# Patient Record
Sex: FEMALE | Race: WHITE
[De-identification: names, ages, dates, MRNs, and addresses within clinical notes are randomized per-mention and may not be internally consistent; named-entity substitution may affect disease eponyms.]

---

## 2019-10-14 ENCOUNTER — HOSPITAL ENCOUNTER (OUTPATIENT)
Dept: HOSPITAL 19 - SDCO | Age: 84
Discharge: HOME | End: 2019-10-14
Attending: INTERNAL MEDICINE
Payer: MEDICARE

## 2019-10-14 VITALS — HEIGHT: 64 IN | WEIGHT: 153 LBS | BODY MASS INDEX: 26.12 KG/M2

## 2019-10-14 VITALS — DIASTOLIC BLOOD PRESSURE: 50 MMHG | SYSTOLIC BLOOD PRESSURE: 99 MMHG | HEART RATE: 87 BPM

## 2019-10-14 VITALS — SYSTOLIC BLOOD PRESSURE: 97 MMHG | HEART RATE: 91 BPM | TEMPERATURE: 98 F | DIASTOLIC BLOOD PRESSURE: 42 MMHG

## 2019-10-14 VITALS — HEART RATE: 79 BPM | SYSTOLIC BLOOD PRESSURE: 118 MMHG | DIASTOLIC BLOOD PRESSURE: 54 MMHG

## 2019-10-14 VITALS — HEART RATE: 86 BPM | TEMPERATURE: 97.6 F | SYSTOLIC BLOOD PRESSURE: 143 MMHG | DIASTOLIC BLOOD PRESSURE: 62 MMHG

## 2019-10-14 VITALS — DIASTOLIC BLOOD PRESSURE: 54 MMHG | HEART RATE: 79 BPM | SYSTOLIC BLOOD PRESSURE: 118 MMHG

## 2019-10-14 DIAGNOSIS — Z79.02: ICD-10-CM

## 2019-10-14 DIAGNOSIS — K22.0: Primary | ICD-10-CM

## 2019-10-14 DIAGNOSIS — T18.128A: ICD-10-CM

## 2019-10-14 DIAGNOSIS — Z79.899: ICD-10-CM

## 2019-10-14 DIAGNOSIS — Z88.6: ICD-10-CM

## 2019-10-14 NOTE — NUR
Pt becoming less and less groggy. Opening eyes and answering questions
appropriately. Pt requesting pudding and successfully ate without n/v. Call
light within reach.

## 2019-10-14 NOTE — NUR
Pt alert and oriented. She denies n/v or pain. Pt states that she is ready to
get dressed and go home. No concerns/questions at this time. Call light within
reach.

## 2019-10-14 NOTE — NUR
Pt ready to go home and meets criteria for discharge. Reviewed discharge
instructions/information with pt and sons. They had no further questions and
expressed understanding of the plan. VSS and WNL, and pt states that she feels
comfortable going home.

## 2019-10-14 NOTE — NUR
Pt arrived to room post procedure groggy but was arousable to name. Pt
ambulated to chair with touch assist. Pt's 02 sats in mid to upper 80's so 2 L
with nasal prongs applied. Pt now saturating in the upper 90's. Pt denies n/v
or pain. Sons at bedside

## 2019-12-19 ENCOUNTER — HOSPITAL ENCOUNTER (INPATIENT)
Dept: HOSPITAL 19 - COL.ER | Age: 84
LOS: 8 days | Discharge: NURSING, REHAB OR LONG-TERM CARE | DRG: 872 | End: 2019-12-27
Attending: FAMILY MEDICINE | Admitting: FAMILY MEDICINE
Payer: MEDICARE

## 2019-12-19 VITALS — BODY MASS INDEX: 25.86 KG/M2 | HEIGHT: 65.98 IN | WEIGHT: 160.94 LBS

## 2019-12-19 DIAGNOSIS — Z88.6: ICD-10-CM

## 2019-12-19 DIAGNOSIS — Z86.73: ICD-10-CM

## 2019-12-19 DIAGNOSIS — Z95.828: ICD-10-CM

## 2019-12-19 DIAGNOSIS — Z79.4: ICD-10-CM

## 2019-12-19 DIAGNOSIS — Z87.891: ICD-10-CM

## 2019-12-19 DIAGNOSIS — Z98.42: ICD-10-CM

## 2019-12-19 DIAGNOSIS — N17.9: ICD-10-CM

## 2019-12-19 DIAGNOSIS — E87.6: ICD-10-CM

## 2019-12-19 DIAGNOSIS — E03.9: ICD-10-CM

## 2019-12-19 DIAGNOSIS — R65.20: ICD-10-CM

## 2019-12-19 DIAGNOSIS — E78.5: ICD-10-CM

## 2019-12-19 DIAGNOSIS — E11.42: ICD-10-CM

## 2019-12-19 DIAGNOSIS — Z98.41: ICD-10-CM

## 2019-12-19 DIAGNOSIS — Z79.891: ICD-10-CM

## 2019-12-19 DIAGNOSIS — N39.0: ICD-10-CM

## 2019-12-19 DIAGNOSIS — K57.32: ICD-10-CM

## 2019-12-19 DIAGNOSIS — Z09: ICD-10-CM

## 2019-12-19 DIAGNOSIS — N18.9: ICD-10-CM

## 2019-12-19 DIAGNOSIS — I25.10: ICD-10-CM

## 2019-12-19 DIAGNOSIS — Z98.51: ICD-10-CM

## 2019-12-19 DIAGNOSIS — I48.92: ICD-10-CM

## 2019-12-19 DIAGNOSIS — I47.1: ICD-10-CM

## 2019-12-19 DIAGNOSIS — I25.2: ICD-10-CM

## 2019-12-19 DIAGNOSIS — G47.00: ICD-10-CM

## 2019-12-19 DIAGNOSIS — E11.51: ICD-10-CM

## 2019-12-19 DIAGNOSIS — Z79.02: ICD-10-CM

## 2019-12-19 DIAGNOSIS — I48.0: ICD-10-CM

## 2019-12-19 DIAGNOSIS — K21.9: ICD-10-CM

## 2019-12-19 DIAGNOSIS — E11.22: ICD-10-CM

## 2019-12-19 DIAGNOSIS — Z90.49: ICD-10-CM

## 2019-12-19 DIAGNOSIS — A41.9: Primary | ICD-10-CM

## 2019-12-19 LAB
ALBUMIN SERPL-MCNC: 4.6 GM/DL (ref 3.5–5)
ALP SERPL-CCNC: 205 U/L (ref 50–136)
ALT SERPL-CCNC: 27 U/L (ref 9–52)
ANION GAP SERPL CALC-SCNC: 17 MMOL/L (ref 7–16)
AST SERPL-CCNC: 39 U/L (ref 15–37)
BASOPHILS # BLD: 0.1 10*3/UL (ref 0–0.2)
BASOPHILS NFR BLD AUTO: 0.7 % (ref 0–2)
BILIRUB SERPL-MCNC: 0.5 MG/DL (ref 0–1)
BUN SERPL-MCNC: 32 MG/DL (ref 7–17)
CALCIUM SERPL-MCNC: 9.9 MG/DL (ref 8.4–10.2)
CHLORIDE SERPL-SCNC: 101 MMOL/L (ref 98–107)
CO2 SERPL-SCNC: 22 MMOL/L (ref 22–30)
CREAT SERPL-SCNC: 2.49 UMOL/L (ref 0.52–1.25)
CRP SERPL-MCNC: < 0.5 MG/DL (ref 0–0.9)
EOSINOPHIL # BLD: 0.2 10*3/UL (ref 0–0.7)
EOSINOPHIL NFR BLD: 1.1 % (ref 0–4)
ERYTHROCYTE [DISTWIDTH] IN BLOOD BY AUTOMATED COUNT: 12.9 % (ref 11.5–14.5)
GLUCOSE SERPL-MCNC: 298 MG/DL (ref 74–106)
GLUCOSE UR QL STRIP.AUTO: (no result)
GRANULOCYTES # BLD AUTO: 80.3 % (ref 42.2–75.2)
HCT VFR BLD AUTO: 39.3 % (ref 37–47)
HGB BLD-MCNC: 12.6 G/DL (ref 12.5–16)
HYALINE CASTS #/AREA URNS LPF: >12 /LPF
LIPASE SERPL-CCNC: 106 U/L (ref 23–300)
LYMPHOCYTES # BLD: 1.8 10*3/UL (ref 1.2–3.4)
LYMPHOCYTES NFR BLD: 12 % (ref 20–51)
MCH RBC QN AUTO: 28 PG (ref 27–31)
MCHC RBC AUTO-ENTMCNC: 32 G/DL (ref 33–37)
MCV RBC AUTO: 88 FL (ref 80–100)
MONOCYTES # BLD: 0.8 10*3/UL (ref 0.1–0.6)
MONOCYTES NFR BLD AUTO: 5.2 % (ref 1.7–9.3)
NEUTROPHILS # BLD: 12.2 10*3/UL (ref 1.4–6.5)
PH UR STRIP.AUTO: 5 [PH] (ref 5–8)
PLATELET # BLD AUTO: 358 K/MM3 (ref 130–400)
PMV BLD AUTO: 9.3 FL (ref 7.4–10.4)
POTASSIUM SERPL-SCNC: 4.9 MMOL/L (ref 3.4–5)
PROT SERPL-MCNC: 8.4 GM/DL (ref 6.4–8.2)
RBC # BLD AUTO: 4.47 M/MM3 (ref 4.1–5.3)
RBC # UR: (no result) /HPF
SODIUM SERPL-SCNC: 141 MMOL/L (ref 137–145)
SP GR UR STRIP.AUTO: 1.01 (ref 1–1.03)
SQUAMOUS # URNS: (no result) /HPF
TROPONIN I SERPL-MCNC: 0.02 NG/ML (ref 0–0.04)
UA DIPSTICK PNL UR STRIP.AUTO: (no result)
URINE LEUKOCYTE ESTERASE: (no result)
URN COLLECT METHOD CLASS: (no result)
UROBILINOGEN UR STRIP.AUTO-MCNC: 2 MG/DL

## 2019-12-19 PROCEDURE — C9113 INJ PANTOPRAZOLE SODIUM, VIA: HCPCS

## 2019-12-19 PROCEDURE — A4216 STERILE WATER/SALINE, 10 ML: HCPCS

## 2019-12-20 VITALS — DIASTOLIC BLOOD PRESSURE: 74 MMHG | HEART RATE: 92 BPM | SYSTOLIC BLOOD PRESSURE: 173 MMHG | TEMPERATURE: 98.1 F

## 2019-12-20 VITALS — TEMPERATURE: 97.6 F | HEART RATE: 91 BPM | DIASTOLIC BLOOD PRESSURE: 84 MMHG | SYSTOLIC BLOOD PRESSURE: 172 MMHG

## 2019-12-20 VITALS — DIASTOLIC BLOOD PRESSURE: 49 MMHG | HEART RATE: 100 BPM | SYSTOLIC BLOOD PRESSURE: 119 MMHG | TEMPERATURE: 97.6 F

## 2019-12-20 VITALS
HEART RATE: 99 BPM | DIASTOLIC BLOOD PRESSURE: 84 MMHG | SYSTOLIC BLOOD PRESSURE: 172 MMHG | TEMPERATURE: 97.6 F | OXYGEN SATURATION: 98 %

## 2019-12-20 VITALS — TEMPERATURE: 98 F | SYSTOLIC BLOOD PRESSURE: 134 MMHG | HEART RATE: 100 BPM | DIASTOLIC BLOOD PRESSURE: 57 MMHG

## 2019-12-20 VITALS — OXYGEN SATURATION: 97 %

## 2019-12-20 VITALS — HEART RATE: 97 BPM | SYSTOLIC BLOOD PRESSURE: 134 MMHG | TEMPERATURE: 98.1 F | DIASTOLIC BLOOD PRESSURE: 57 MMHG

## 2019-12-20 VITALS — OXYGEN SATURATION: 98 %

## 2019-12-20 VITALS — OXYGEN SATURATION: 99 %

## 2019-12-20 LAB
ANION GAP SERPL CALC-SCNC: 12 MMOL/L (ref 7–16)
BASOPHILS # BLD: 0.1 10*3/UL (ref 0–0.2)
BASOPHILS NFR BLD AUTO: 0.5 % (ref 0–2)
BUN SERPL-MCNC: 30 MG/DL (ref 7–17)
CALCIUM SERPL-MCNC: 8.7 MG/DL (ref 8.4–10.2)
CHLORIDE SERPL-SCNC: 106 MMOL/L (ref 98–107)
CO2 SERPL-SCNC: 22 MMOL/L (ref 22–30)
CREAT SERPL-SCNC: 1.71 UMOL/L (ref 0.52–1.25)
DIGOXIN SERPL-MCNC: 43.4 NG/ML (ref 3–18.6)
EOSINOPHIL # BLD: 0 10*3/UL (ref 0–0.7)
EOSINOPHIL NFR BLD: 0.2 % (ref 0–4)
ERYTHROCYTE [DISTWIDTH] IN BLOOD BY AUTOMATED COUNT: 12.8 % (ref 11.5–14.5)
GLUCOSE SERPL-MCNC: 238 MG/DL (ref 74–106)
GRANULOCYTES # BLD AUTO: 83.6 % (ref 42.2–75.2)
HCT VFR BLD AUTO: 35.8 % (ref 37–47)
HGB BLD-MCNC: 12 G/DL (ref 12.5–16)
LYMPHOCYTES # BLD: 1.6 10*3/UL (ref 1.2–3.4)
LYMPHOCYTES NFR BLD: 9 % (ref 20–51)
MAGNESIUM SERPL-MCNC: 1.7 MG/DL (ref 1.6–2.3)
MCH RBC QN AUTO: 29 PG (ref 27–31)
MCHC RBC AUTO-ENTMCNC: 34 G/DL (ref 33–37)
MCV RBC AUTO: 85 FL (ref 80–100)
MONOCYTES # BLD: 1.1 10*3/UL (ref 0.1–0.6)
MONOCYTES NFR BLD AUTO: 6.2 % (ref 1.7–9.3)
NEUTROPHILS # BLD: 14.4 10*3/UL (ref 1.4–6.5)
PLATELET # BLD AUTO: 311 K/MM3 (ref 130–400)
PMV BLD AUTO: 9.2 FL (ref 7.4–10.4)
POTASSIUM SERPL-SCNC: 5 MMOL/L (ref 3.4–5)
RBC # BLD AUTO: 4.21 M/MM3 (ref 4.1–5.3)
SODIUM SERPL-SCNC: 140 MMOL/L (ref 137–145)
TROPONIN I SERPL-MCNC: 0.03 NG/ML (ref 0–0.03)
TSH SERPL DL<=0.005 MIU/L-ACNC: 1.14 UIU/ML (ref 0.47–4.68)

## 2019-12-20 NOTE — NUR
PATIENT HAVING PERSISTENT URINARY RETENTION.  PATIENT HAS BEEN STRAIGHT CATHED
X2 TODAY.  PATIENT BLADDER SCANNED AGAIN AND PT RETAINING 700ML OF URINE.
16FR DIXON CATHETER INSERTED FOR URINARY RETENTION TO MINIMIZE RISK OF
INTRODUCTION OF BACTERIA.

## 2019-12-20 NOTE — NUR
Patient reported feeling urge to urinate but unable to void on bedpan.
Bladder scan showed 971 ml.  Hospitalist notifed; received order to straight
cath.

## 2019-12-20 NOTE — NUR
PT BEGAN HEAVING AND VOMITING SMALL AMTS OF YELLOW, FROTHY EMESIS. ZOFRAN IV
GIVEN. PATIENT BEGAN FEELING BETTER BUT C/O LOWER ABD PAIN OF 7/10.  IV
DILAUDID GIVEN.  PATIENT FEELING MUCH BETTER.

## 2019-12-20 NOTE — NUR
IV TO LFA AND LEFT WRIST BEGAN STINGING AND REDDENED. 20G PERIPHERAL IV
INSERTED TO OUTER RIGHT AC.  IV TO LEFT WRIST AND LEFT FOREARM DISCONTINUED.

## 2019-12-20 NOTE — NUR
PT RETURNED FROM MRI VIA WHEELCHAIR BY [employer].  PATIENT A LITTLE SHAKEY UPON
STANDING BUT ABLE TO TAKE A FEW STEPS TO BED.

## 2019-12-20 NOTE — NUR
PT UNBALE TO VOID SINCE THIS AM. PT BLADDER SCANNED AND NOTED TO HAVE >600ML
URINE. PT STRAIGHT CATHED AND 650ML OF MARTINEZ COLORED URINE OBTAINED.

## 2019-12-20 NOTE — NUR
met with patient and patient's son Miki (ph#[phone]) and
daughter in law Tate to discuss discharge planning. Patient lives in
[place] with her other son Lyle (ph#[phone]). Patient has another
son Declan (ph#[phone]) who lives next door to patient. Patient sees Dr. Belle for primary care and obtains medications from [facility] in
[place]. Patient uses a walker as needed and reports independence with ADLS
prior to hospitalization. Patient did not have Advance Directives but
expressed she wanted to establish DPOA-HC. Patient states she wants to
designate her sons, Miki and Lyle. Patient verbalized to CAREN and RN-Rozina RODRIGUEZ that she wants to designate Miki and Lyle to make medical decisions
for her if she were unable. CAREN and Rozina provided witness signature. CAREN
provided original and copies to patient and placed copy in chart. SW to
continue to follow.

## 2019-12-21 VITALS — SYSTOLIC BLOOD PRESSURE: 128 MMHG | DIASTOLIC BLOOD PRESSURE: 48 MMHG | TEMPERATURE: 98.4 F | HEART RATE: 94 BPM

## 2019-12-21 VITALS — SYSTOLIC BLOOD PRESSURE: 155 MMHG | HEART RATE: 95 BPM | DIASTOLIC BLOOD PRESSURE: 58 MMHG | TEMPERATURE: 93 F

## 2019-12-21 VITALS — DIASTOLIC BLOOD PRESSURE: 48 MMHG | SYSTOLIC BLOOD PRESSURE: 132 MMHG | TEMPERATURE: 98.6 F | HEART RATE: 100 BPM

## 2019-12-21 VITALS — DIASTOLIC BLOOD PRESSURE: 68 MMHG | SYSTOLIC BLOOD PRESSURE: 142 MMHG | HEART RATE: 96 BPM | TEMPERATURE: 98.3 F

## 2019-12-21 VITALS — HEART RATE: 92 BPM | DIASTOLIC BLOOD PRESSURE: 72 MMHG | SYSTOLIC BLOOD PRESSURE: 161 MMHG | TEMPERATURE: 98.4 F

## 2019-12-21 VITALS — DIASTOLIC BLOOD PRESSURE: 62 MMHG | TEMPERATURE: 97.8 F | HEART RATE: 98 BPM | SYSTOLIC BLOOD PRESSURE: 156 MMHG

## 2019-12-21 LAB
ALBUMIN SERPL-MCNC: 3.8 GM/DL (ref 3.5–5)
ALP SERPL-CCNC: 105 U/L (ref 50–136)
ALT SERPL-CCNC: 16 U/L (ref 9–52)
ANION GAP SERPL CALC-SCNC: 10 MMOL/L (ref 7–16)
AST SERPL-CCNC: 21 U/L (ref 15–37)
BASOPHILS # BLD: 0.1 10*3/UL (ref 0–0.2)
BASOPHILS NFR BLD AUTO: 0.6 % (ref 0–2)
BILIRUB SERPL-MCNC: 0.4 MG/DL (ref 0–1)
BUN SERPL-MCNC: 20 MG/DL (ref 7–17)
CALCIUM SERPL-MCNC: 9 MG/DL (ref 8.4–10.2)
CHLORIDE SERPL-SCNC: 108 MMOL/L (ref 98–107)
CO2 SERPL-SCNC: 24 MMOL/L (ref 22–30)
CREAT SERPL-SCNC: 1.53 UMOL/L (ref 0.52–1.25)
EOSINOPHIL # BLD: 0.2 10*3/UL (ref 0–0.7)
EOSINOPHIL NFR BLD: 1.7 % (ref 0–4)
ERYTHROCYTE [DISTWIDTH] IN BLOOD BY AUTOMATED COUNT: 13.1 % (ref 11.5–14.5)
GLUCOSE SERPL-MCNC: 177 MG/DL (ref 74–106)
GRANULOCYTES # BLD AUTO: 68.9 % (ref 42.2–75.2)
HCT VFR BLD AUTO: 33.2 % (ref 37–47)
HGB BLD-MCNC: 10.9 G/DL (ref 12.5–16)
INR BLD: 1 (ref 0.8–3)
LYMPHOCYTES # BLD: 2.8 10*3/UL (ref 1.2–3.4)
LYMPHOCYTES NFR BLD: 20 % (ref 20–51)
MCH RBC QN AUTO: 29 PG (ref 27–31)
MCHC RBC AUTO-ENTMCNC: 33 G/DL (ref 33–37)
MCV RBC AUTO: 87 FL (ref 80–100)
MONOCYTES # BLD: 1.2 10*3/UL (ref 0.1–0.6)
MONOCYTES NFR BLD AUTO: 8.2 % (ref 1.7–9.3)
NEUTROPHILS # BLD: 9.7 10*3/UL (ref 1.4–6.5)
PLATELET # BLD AUTO: 289 K/MM3 (ref 130–400)
PMV BLD AUTO: 9.2 FL (ref 7.4–10.4)
POTASSIUM SERPL-SCNC: 4.5 MMOL/L (ref 3.4–5)
PROT SERPL-MCNC: 7 GM/DL (ref 6.4–8.2)
PROTHROMBIN TIME: 12.2 SECONDS (ref 9.7–12.8)
RBC # BLD AUTO: 3.83 M/MM3 (ref 4.1–5.3)
SODIUM SERPL-SCNC: 142 MMOL/L (ref 137–145)

## 2019-12-21 NOTE — NUR
Pt up to room 354, escorted via WC by MIRTHA Ernst. Pt A&O, 1 assist in room. Pt
on tele, room air and has peterson to dependent drainage w/ clear yellow urine,
no complications. RAC IV flushes well and has abx running w/o complications.
Lungs cta, BSx4, heart RRR, pulses strong. Pt c/o nausea and LLQ pain, rating
it 9/10. Pt denies diarrhea at this time, denies SOB, chest pain, dizziness.
No other concerns expressed at this time.

## 2019-12-21 NOTE — NUR
Plan: Return home tow [place] with Son Lavon [phone] as care
support and transportation.
Assess: SW met with patient about plan. Nirmal reports that she resides at
home. Denies home health services and states that her son helps her a lot and
live right across the street. Patient reports that she obtains her medications
at [facility] in [place]. Patient reports she has a walker and uses prn.
Client reports that she signed a DPOA. Patient reports that her son Lázaro will
transport her home. Has family coming in for holiday. Patient reports Dr. Pabon
as her PCP with no [facility].
Action: SW educated on services and supports available. No additional needs
identified at this time.

## 2019-12-21 NOTE — NUR
LLQ Abdomen tender with light palpation - pt guarding that side. Urology
consultation noted from [id number] yesterday - MD not notified yesterday or
overnight. MD Alberto notified at this time - MD will be in to see pt today. Pt
updated. No questions or concerns.

## 2019-12-21 NOTE — NUR
Initial shift assessment done- very pleasant lady- no requests, Arzola with
clear yellow urine- no stools at this time,, states having pain to left foot-
very tender-was just medicated at shift change, tele on, will call for
assistance when needed

## 2019-12-22 VITALS — HEART RATE: 101 BPM | DIASTOLIC BLOOD PRESSURE: 49 MMHG | SYSTOLIC BLOOD PRESSURE: 129 MMHG | TEMPERATURE: 98.4 F

## 2019-12-22 VITALS — HEART RATE: 93 BPM | TEMPERATURE: 98.2 F | DIASTOLIC BLOOD PRESSURE: 45 MMHG | SYSTOLIC BLOOD PRESSURE: 102 MMHG

## 2019-12-22 VITALS — TEMPERATURE: 98.7 F | HEART RATE: 94 BPM | DIASTOLIC BLOOD PRESSURE: 85 MMHG | SYSTOLIC BLOOD PRESSURE: 114 MMHG

## 2019-12-22 VITALS — SYSTOLIC BLOOD PRESSURE: 114 MMHG | TEMPERATURE: 99.1 F | DIASTOLIC BLOOD PRESSURE: 48 MMHG | HEART RATE: 89 BPM

## 2019-12-22 VITALS — TEMPERATURE: 97.3 F | HEART RATE: 97 BPM | SYSTOLIC BLOOD PRESSURE: 120 MMHG | DIASTOLIC BLOOD PRESSURE: 42 MMHG

## 2019-12-22 VITALS — TEMPERATURE: 98.4 F | HEART RATE: 97 BPM | SYSTOLIC BLOOD PRESSURE: 122 MMHG | DIASTOLIC BLOOD PRESSURE: 47 MMHG

## 2019-12-22 VITALS — DIASTOLIC BLOOD PRESSURE: 61 MMHG | HEART RATE: 93 BPM | SYSTOLIC BLOOD PRESSURE: 146 MMHG | TEMPERATURE: 97.8 F

## 2019-12-22 LAB
ALBUMIN SERPL-MCNC: 3.5 GM/DL (ref 3.5–5)
ALP SERPL-CCNC: 90 U/L (ref 50–136)
ALT SERPL-CCNC: 16 U/L (ref 9–52)
ANION GAP SERPL CALC-SCNC: 9 MMOL/L (ref 7–16)
AST SERPL-CCNC: 18 U/L (ref 15–37)
BASOPHILS # BLD: 0.1 10*3/UL (ref 0–0.2)
BASOPHILS NFR BLD AUTO: 0.9 % (ref 0–2)
BILIRUB SERPL-MCNC: 0.3 MG/DL (ref 0–1)
BUN SERPL-MCNC: 22 MG/DL (ref 7–17)
CALCIUM SERPL-MCNC: 8.7 MG/DL (ref 8.4–10.2)
CHLORIDE SERPL-SCNC: 105 MMOL/L (ref 98–107)
CO2 SERPL-SCNC: 25 MMOL/L (ref 22–30)
CREAT SERPL-SCNC: 1.61 UMOL/L (ref 0.52–1.25)
EOSINOPHIL # BLD: 0.6 10*3/UL (ref 0–0.7)
EOSINOPHIL NFR BLD: 4.2 % (ref 0–4)
ERYTHROCYTE [DISTWIDTH] IN BLOOD BY AUTOMATED COUNT: 12.9 % (ref 11.5–14.5)
GLUCOSE SERPL-MCNC: 156 MG/DL (ref 74–106)
GRANULOCYTES # BLD AUTO: 64.6 % (ref 42.2–75.2)
HCT VFR BLD AUTO: 31.7 % (ref 37–47)
HGB BLD-MCNC: 10 G/DL (ref 12.5–16)
INR BLD: 1 (ref 0.8–3)
LYMPHOCYTES # BLD: 3 10*3/UL (ref 1.2–3.4)
LYMPHOCYTES NFR BLD: 21.6 % (ref 20–51)
MCH RBC QN AUTO: 28 PG (ref 27–31)
MCHC RBC AUTO-ENTMCNC: 32 G/DL (ref 33–37)
MCV RBC AUTO: 89 FL (ref 80–100)
MONOCYTES # BLD: 1.2 10*3/UL (ref 0.1–0.6)
MONOCYTES NFR BLD AUTO: 8.4 % (ref 1.7–9.3)
NEUTROPHILS # BLD: 9 10*3/UL (ref 1.4–6.5)
PLATELET # BLD AUTO: 281 K/MM3 (ref 130–400)
PMV BLD AUTO: 9.5 FL (ref 7.4–10.4)
POTASSIUM SERPL-SCNC: 4.9 MMOL/L (ref 3.4–5)
PROT SERPL-MCNC: 6.8 GM/DL (ref 6.4–8.2)
PROTHROMBIN TIME: 11.7 SECONDS (ref 9.7–12.8)
RBC # BLD AUTO: 3.57 M/MM3 (ref 4.1–5.3)
SODIUM SERPL-SCNC: 138 MMOL/L (ref 137–145)

## 2019-12-22 NOTE — NUR
Pt assessment completed and charted. Morning medications administered per MAR.
Pt laying in bed, A&O. Pt denies dizziness, SOB. Pt denies diarrhea or any BM
since arriving to floor and episode yesterday. Pt had episode of nausea and
minimal emesis this morning after breakfast. Pt refusing anti-nausea meds at
this time, feels it may be "d/t eating". Pt c/o left foot/ankle pain, rating
it 10/10, received pain medication per MAR. Arzola in place draining clear
yellow urine. Pt on room air, VSS, breathing is even and unlabored. Heart RRR.
Pusles strong bilaterally. Neuro checks WNL. Pt able to raise left leg, d/t
pain, unable to move ankle. No other concerns expressed at this time. Call
light within reach.

## 2019-12-23 VITALS — TEMPERATURE: 98 F | SYSTOLIC BLOOD PRESSURE: 126 MMHG | DIASTOLIC BLOOD PRESSURE: 56 MMHG | HEART RATE: 98 BPM

## 2019-12-23 VITALS — HEART RATE: 97 BPM | DIASTOLIC BLOOD PRESSURE: 62 MMHG | SYSTOLIC BLOOD PRESSURE: 153 MMHG | TEMPERATURE: 98.7 F

## 2019-12-23 VITALS — DIASTOLIC BLOOD PRESSURE: 54 MMHG | SYSTOLIC BLOOD PRESSURE: 143 MMHG | TEMPERATURE: 99.3 F | HEART RATE: 101 BPM

## 2019-12-23 VITALS — SYSTOLIC BLOOD PRESSURE: 149 MMHG | TEMPERATURE: 98.6 F | HEART RATE: 95 BPM | DIASTOLIC BLOOD PRESSURE: 39 MMHG

## 2019-12-23 VITALS — HEART RATE: 86 BPM | TEMPERATURE: 98.2 F | DIASTOLIC BLOOD PRESSURE: 50 MMHG | SYSTOLIC BLOOD PRESSURE: 133 MMHG

## 2019-12-23 VITALS — SYSTOLIC BLOOD PRESSURE: 138 MMHG | DIASTOLIC BLOOD PRESSURE: 61 MMHG | HEART RATE: 99 BPM | TEMPERATURE: 98.9 F

## 2019-12-23 LAB
ALBUMIN SERPL-MCNC: 3.7 GM/DL (ref 3.5–5)
ALP SERPL-CCNC: 92 U/L (ref 50–136)
ALT SERPL-CCNC: 17 U/L (ref 9–52)
ANION GAP SERPL CALC-SCNC: 7 MMOL/L (ref 7–16)
AST SERPL-CCNC: 22 U/L (ref 15–37)
BASOPHILS # BLD: 0.1 10*3/UL (ref 0–0.2)
BASOPHILS NFR BLD AUTO: 0.6 % (ref 0–2)
BILIRUB SERPL-MCNC: 0.2 MG/DL (ref 0–1)
BUN SERPL-MCNC: 22 MG/DL (ref 7–17)
CALCIUM SERPL-MCNC: 9.1 MG/DL (ref 8.4–10.2)
CHLORIDE SERPL-SCNC: 103 MMOL/L (ref 98–107)
CO2 SERPL-SCNC: 26 MMOL/L (ref 22–30)
CREAT SERPL-SCNC: 1.49 UMOL/L (ref 0.52–1.25)
EOSINOPHIL # BLD: 0.3 10*3/UL (ref 0–0.7)
EOSINOPHIL NFR BLD: 2.1 % (ref 0–4)
ERYTHROCYTE [DISTWIDTH] IN BLOOD BY AUTOMATED COUNT: 12.7 % (ref 11.5–14.5)
GLUCOSE SERPL-MCNC: 114 MG/DL (ref 74–106)
GRANULOCYTES # BLD AUTO: 68.2 % (ref 42.2–75.2)
HCT VFR BLD AUTO: 30.8 % (ref 37–47)
HGB BLD-MCNC: 10.1 G/DL (ref 12.5–16)
INR BLD: 1 (ref 0.8–3)
LYMPHOCYTES # BLD: 2.7 10*3/UL (ref 1.2–3.4)
LYMPHOCYTES NFR BLD: 19 % (ref 20–51)
MCH RBC QN AUTO: 29 PG (ref 27–31)
MCHC RBC AUTO-ENTMCNC: 33 G/DL (ref 33–37)
MCV RBC AUTO: 88 FL (ref 80–100)
MONOCYTES # BLD: 1.3 10*3/UL (ref 0.1–0.6)
MONOCYTES NFR BLD AUTO: 9.5 % (ref 1.7–9.3)
NEUTROPHILS # BLD: 9.6 10*3/UL (ref 1.4–6.5)
PLATELET # BLD AUTO: 292 K/MM3 (ref 130–400)
PMV BLD AUTO: 9.4 FL (ref 7.4–10.4)
POTASSIUM SERPL-SCNC: 4.7 MMOL/L (ref 3.4–5)
PROT SERPL-MCNC: 7.1 GM/DL (ref 6.4–8.2)
PROTHROMBIN TIME: 11.9 SECONDS (ref 9.7–12.8)
RBC # BLD AUTO: 3.51 M/MM3 (ref 4.1–5.3)
SODIUM SERPL-SCNC: 136 MMOL/L (ref 137–145)

## 2019-12-23 NOTE — NUR
Shift assessment complete. Patient in bed, awake. States pain 8/10 in left
foot. Left foot wrapped in ACE wrap, per pt request. Prn pain medication
given. Denies further needs at this time. Will continue to monitor.

## 2019-12-23 NOTE — NUR
Pt resting in the room today, has C/O pain in her left foot / ankle, pain
medications given, foot wrapped with ace wrap for support, ice pack provided,
and extremity elevated. Pt has had several suns of SVT, provider notified, Vs
have otherwise remained stable.

## 2019-12-23 NOTE — NUR
Pt awake and alert this morning, some C/O pain, shift assessments complete,
left Pt call light in reach, bed in lowest position.

## 2019-12-24 VITALS — HEART RATE: 92 BPM | SYSTOLIC BLOOD PRESSURE: 164 MMHG | DIASTOLIC BLOOD PRESSURE: 51 MMHG | TEMPERATURE: 98.3 F

## 2019-12-24 VITALS — TEMPERATURE: 99.2 F | SYSTOLIC BLOOD PRESSURE: 130 MMHG | HEART RATE: 89 BPM | DIASTOLIC BLOOD PRESSURE: 43 MMHG

## 2019-12-24 VITALS — TEMPERATURE: 98.1 F | DIASTOLIC BLOOD PRESSURE: 56 MMHG | HEART RATE: 90 BPM | SYSTOLIC BLOOD PRESSURE: 144 MMHG

## 2019-12-24 VITALS — DIASTOLIC BLOOD PRESSURE: 54 MMHG | SYSTOLIC BLOOD PRESSURE: 168 MMHG

## 2019-12-24 VITALS — SYSTOLIC BLOOD PRESSURE: 152 MMHG | HEART RATE: 91 BPM | TEMPERATURE: 98.2 F | DIASTOLIC BLOOD PRESSURE: 44 MMHG

## 2019-12-24 VITALS — DIASTOLIC BLOOD PRESSURE: 53 MMHG | SYSTOLIC BLOOD PRESSURE: 134 MMHG | TEMPERATURE: 98.5 F | HEART RATE: 92 BPM

## 2019-12-24 VITALS — DIASTOLIC BLOOD PRESSURE: 65 MMHG | TEMPERATURE: 97.8 F | HEART RATE: 67 BPM | SYSTOLIC BLOOD PRESSURE: 108 MMHG

## 2019-12-24 VITALS — DIASTOLIC BLOOD PRESSURE: 42 MMHG | SYSTOLIC BLOOD PRESSURE: 135 MMHG

## 2019-12-24 LAB
ANION GAP SERPL CALC-SCNC: 8 MMOL/L (ref 7–16)
BASOPHILS # BLD: 0.1 10*3/UL (ref 0–0.2)
BASOPHILS NFR BLD AUTO: 0.5 % (ref 0–2)
BUN SERPL-MCNC: 25 MG/DL (ref 7–17)
CALCIUM SERPL-MCNC: 8.9 MG/DL (ref 8.4–10.2)
CHLORIDE SERPL-SCNC: 102 MMOL/L (ref 98–107)
CO2 SERPL-SCNC: 27 MMOL/L (ref 22–30)
CREAT SERPL-SCNC: 1.44 UMOL/L (ref 0.52–1.25)
EOSINOPHIL # BLD: 0.2 10*3/UL (ref 0–0.7)
EOSINOPHIL NFR BLD: 1.9 % (ref 0–4)
ERYTHROCYTE [DISTWIDTH] IN BLOOD BY AUTOMATED COUNT: 12.7 % (ref 11.5–14.5)
GLUCOSE SERPL-MCNC: 99 MG/DL (ref 74–106)
GRANULOCYTES # BLD AUTO: 67.1 % (ref 42.2–75.2)
HCT VFR BLD AUTO: 29.5 % (ref 37–47)
HGB BLD-MCNC: 9.7 G/DL (ref 12.5–16)
LYMPHOCYTES # BLD: 2.4 10*3/UL (ref 1.2–3.4)
LYMPHOCYTES NFR BLD: 19 % (ref 20–51)
MAGNESIUM SERPL-MCNC: 1.6 MG/DL (ref 1.6–2.3)
MCH RBC QN AUTO: 29 PG (ref 27–31)
MCHC RBC AUTO-ENTMCNC: 33 G/DL (ref 33–37)
MCV RBC AUTO: 87 FL (ref 80–100)
MONOCYTES # BLD: 1.4 10*3/UL (ref 0.1–0.6)
MONOCYTES NFR BLD AUTO: 11 % (ref 1.7–9.3)
NEUTROPHILS # BLD: 8.6 10*3/UL (ref 1.4–6.5)
PLATELET # BLD AUTO: 332 K/MM3 (ref 130–400)
PMV BLD AUTO: 9.6 FL (ref 7.4–10.4)
POTASSIUM SERPL-SCNC: 4.5 MMOL/L (ref 3.4–5)
RBC # BLD AUTO: 3.4 M/MM3 (ref 4.1–5.3)
SODIUM SERPL-SCNC: 137 MMOL/L (ref 137–145)

## 2019-12-24 NOTE — NUR
CNA reports /51. /54 on recheck. Provided with PRN hydralazine as
ordered. INT right AC leaking. Restarted in left forearm. Tolerated well.
Denies needs at this time.

## 2019-12-24 NOTE — NUR
Resting in bed. Assessment complete. Lungs clear. Heart sounds normal. Bowels
active x4. Pulses strong throughout. No edema noted. INT right AC without
complications. Denies needs at this time. Recently provided tylenol for pain.
Call light in reach. Arzola to dependent drainage.

## 2019-12-24 NOTE — NUR
Patient in bed, awake. States pain 8/10 in left foot. Prn pain medication
given per request. IV flushed with NS, abx infusion started. Denies further
needs at this time. Will continue to monitor.

## 2019-12-24 NOTE — NUR
Shift assessment completed and charted. Medications administered per MAR. Pt
tolerated well. Pt received PRN pain medication for left ankle/foot. Pt rating
pain 9-10/10, hurts to touch or move. Ace wrap removed throughout day, ankle
elevated and ice pack applied to ankle intermittently throughout day. Pt
currently refusing pain medication, would like it "later after dinner". Will
discuss w/ oncoming night shift nurse. Pt has peterson draining clear yellow
urine, no complications. Pt was incontinent of stool, bed change completed. Pt
had loose/soft formed stool, improving from diarrhea a couple of days ago. Pt
denies any other pain, dizziness, SOB. Pt denies nausea/vomiting throughout
the day today. Pt has RAC INT IV, flushes w/o complications. No other concerns
expressed at this time.

## 2019-12-24 NOTE — NUR
CAREN met with the patient to review dischange plan and to discuss PT/OT's
recommendation of post-acute rehab. The patient reports that she would be
interested is post-acute rehab. CAREN presented and explained the Patient Choice
Form and provided her with Medicare.[facility]'s list of nursing homes in the
[facility]. The patient chose 1) [facility] 2) [facility].
Patient Choice Form signed and she was provided a copy. CAREN contacted the
patient's son, Lyle, to update on the patient's decision. Lyle was
supportive of the patient's decision and he states that his brother, Declan,
would be able to provide transportation for the patient to [facility]; if accepted there. CAREN contacted and faxed a referral to both facilities.
SW awaiting their screens.
 
[facility] ph#[phone], fax#[phone]

## 2019-12-24 NOTE — NUR
Resting in bed. Assessment complete. Lungs clear. Heart sounds normal. Bowels
 active x4. Pulses strong throughout. No edema noted.  Denies needs at this
time. Recently provided tylenol for pain.  Call light in reach. Arzola to
dependent drainage with clear yellow urine.

## 2019-12-24 NOTE — NUR
Per telemetry, pt in afib x 1min, then RVR, then back into NSR. Patient in
bed, sleeping. VS stable. Will notify Dr. Quezada, as cardiology is not on
board.

## 2019-12-25 VITALS — HEART RATE: 87 BPM | TEMPERATURE: 98.2 F | DIASTOLIC BLOOD PRESSURE: 48 MMHG | SYSTOLIC BLOOD PRESSURE: 135 MMHG

## 2019-12-25 VITALS — TEMPERATURE: 98.3 F | DIASTOLIC BLOOD PRESSURE: 44 MMHG | SYSTOLIC BLOOD PRESSURE: 126 MMHG | HEART RATE: 86 BPM

## 2019-12-25 VITALS — SYSTOLIC BLOOD PRESSURE: 143 MMHG | HEART RATE: 86 BPM | DIASTOLIC BLOOD PRESSURE: 50 MMHG | TEMPERATURE: 97.8 F

## 2019-12-25 VITALS — TEMPERATURE: 98.7 F | SYSTOLIC BLOOD PRESSURE: 125 MMHG | DIASTOLIC BLOOD PRESSURE: 44 MMHG | HEART RATE: 84 BPM

## 2019-12-25 VITALS — TEMPERATURE: 97.9 F | SYSTOLIC BLOOD PRESSURE: 148 MMHG | HEART RATE: 91 BPM | DIASTOLIC BLOOD PRESSURE: 42 MMHG

## 2019-12-25 VITALS — SYSTOLIC BLOOD PRESSURE: 142 MMHG | HEART RATE: 87 BPM | DIASTOLIC BLOOD PRESSURE: 51 MMHG | TEMPERATURE: 97.9 F

## 2019-12-25 LAB
ANION GAP SERPL CALC-SCNC: 9 MMOL/L (ref 7–16)
BASOPHILS # BLD: 0.1 10*3/UL (ref 0–0.2)
BASOPHILS NFR BLD AUTO: 0.8 % (ref 0–2)
BUN SERPL-MCNC: 35 MG/DL (ref 7–17)
CALCIUM SERPL-MCNC: 8.6 MG/DL (ref 8.4–10.2)
CHLORIDE SERPL-SCNC: 102 MMOL/L (ref 98–107)
CO2 SERPL-SCNC: 25 MMOL/L (ref 22–30)
CREAT SERPL-SCNC: 1.47 UMOL/L (ref 0.52–1.25)
EOSINOPHIL # BLD: 0.5 10*3/UL (ref 0–0.7)
EOSINOPHIL NFR BLD: 3.7 % (ref 0–4)
ERYTHROCYTE [DISTWIDTH] IN BLOOD BY AUTOMATED COUNT: 12.7 % (ref 11.5–14.5)
GLUCOSE SERPL-MCNC: 203 MG/DL (ref 74–106)
GRANULOCYTES # BLD AUTO: 64.2 % (ref 42.2–75.2)
HCT VFR BLD AUTO: 29.5 % (ref 37–47)
HGB BLD-MCNC: 9.6 G/DL (ref 12.5–16)
LYMPHOCYTES # BLD: 2.5 10*3/UL (ref 1.2–3.4)
LYMPHOCYTES NFR BLD: 20.4 % (ref 20–51)
MCH RBC QN AUTO: 28 PG (ref 27–31)
MCHC RBC AUTO-ENTMCNC: 33 G/DL (ref 33–37)
MCV RBC AUTO: 87 FL (ref 80–100)
MONOCYTES # BLD: 1.3 10*3/UL (ref 0.1–0.6)
MONOCYTES NFR BLD AUTO: 10.3 % (ref 1.7–9.3)
NEUTROPHILS # BLD: 7.9 10*3/UL (ref 1.4–6.5)
PLATELET # BLD AUTO: 376 K/MM3 (ref 130–400)
PMV BLD AUTO: 9.2 FL (ref 7.4–10.4)
POTASSIUM SERPL-SCNC: 4.9 MMOL/L (ref 3.4–5)
RBC # BLD AUTO: 3.39 M/MM3 (ref 4.1–5.3)
SODIUM SERPL-SCNC: 136 MMOL/L (ref 137–145)

## 2019-12-25 NOTE — NUR
Pt assisted to bedside commode, unable to bear weight on left foot at all. Pt
rating pain 10/10. Pt received PRN dilaudid per MAR. Per pt, percocet earlier
brought pain down some. Left foot/ankle is slightly swollen and tender to
touch, no bruising noted.

## 2019-12-25 NOTE — NUR
Resting in bed. Assessment complete. Lungs clear. Heart sounds normal. Bowels
active x4. Pulses strong throughout. No edema noted. Left ankle/foot swelling
present. Ankle appears reddened with darker red rash area. Very tender to
touch. Patient recently received xray and reporting 10/10 pain. Provided with
PRN percocet at this time. Coccyx erythema/rash present. Patient requested to
wear brief throughout night. Left forearm INT flushed without complications.
Denies other needs at this time. Call light in reach.

## 2019-12-25 NOTE — NUR
Pt assessment completed and charted. Morning medications administered per MAR.
Pt A&O. Rating left foot/ankle pain 9/10. Received PRN dilaudid and ice pack.
Pt denies N/V/D, SOB, dizziness, or other complaints at this time. Heart RRR,
on tele, NS at this time. No cardiac events overnight. LS cta, pulses strong
bilaterally. Pt has LFA INT IV that flushes w/o complications. Pt on room air.
Arzola draining w/ clear, yellow urine. No other concerns expressed at this
time.

## 2019-12-25 NOTE — NUR
Patient received x1 dose of tylenol and x2 doses of percocet for pain control
in left leg. Resting in bed this AM. Denies needs. Call light in reach.

## 2019-12-26 VITALS — DIASTOLIC BLOOD PRESSURE: 56 MMHG | SYSTOLIC BLOOD PRESSURE: 138 MMHG | TEMPERATURE: 98.4 F | HEART RATE: 84 BPM

## 2019-12-26 VITALS — DIASTOLIC BLOOD PRESSURE: 54 MMHG | TEMPERATURE: 97.5 F | SYSTOLIC BLOOD PRESSURE: 139 MMHG | HEART RATE: 84 BPM

## 2019-12-26 VITALS — TEMPERATURE: 97.9 F | DIASTOLIC BLOOD PRESSURE: 98 MMHG | SYSTOLIC BLOOD PRESSURE: 120 MMHG | HEART RATE: 86 BPM

## 2019-12-26 VITALS — HEART RATE: 88 BPM | DIASTOLIC BLOOD PRESSURE: 49 MMHG | SYSTOLIC BLOOD PRESSURE: 158 MMHG | TEMPERATURE: 98.2 F

## 2019-12-26 VITALS — HEART RATE: 90 BPM | TEMPERATURE: 98.1 F | SYSTOLIC BLOOD PRESSURE: 135 MMHG | DIASTOLIC BLOOD PRESSURE: 55 MMHG

## 2019-12-26 VITALS — TEMPERATURE: 99 F | HEART RATE: 85 BPM | SYSTOLIC BLOOD PRESSURE: 121 MMHG | DIASTOLIC BLOOD PRESSURE: 42 MMHG

## 2019-12-26 VITALS — HEART RATE: 91 BPM | SYSTOLIC BLOOD PRESSURE: 137 MMHG | TEMPERATURE: 98.1 F | DIASTOLIC BLOOD PRESSURE: 50 MMHG

## 2019-12-26 LAB
ANION GAP SERPL CALC-SCNC: 8 MMOL/L (ref 7–16)
ANION GAP SERPL CALC-SCNC: 8 MMOL/L (ref 7–16)
BASOPHILS # BLD: 0.1 10*3/UL (ref 0–0.2)
BASOPHILS NFR BLD AUTO: 0.6 % (ref 0–2)
BUN SERPL-MCNC: 37 MG/DL (ref 7–17)
BUN SERPL-MCNC: 37 MG/DL (ref 7–17)
CALCIUM SERPL-MCNC: 8.5 MG/DL (ref 8.4–10.2)
CALCIUM SERPL-MCNC: 8.6 MG/DL (ref 8.4–10.2)
CHLORIDE SERPL-SCNC: 101 MMOL/L (ref 98–107)
CHLORIDE SERPL-SCNC: 101 MMOL/L (ref 98–107)
CO2 SERPL-SCNC: 25 MMOL/L (ref 22–30)
CO2 SERPL-SCNC: 25 MMOL/L (ref 22–30)
CREAT SERPL-SCNC: 1.47 UMOL/L (ref 0.52–1.25)
CREAT SERPL-SCNC: 1.51 UMOL/L (ref 0.52–1.25)
CRP SERPL-MCNC: 6 MG/DL (ref 0–0.9)
EOSINOPHIL # BLD: 0.5 10*3/UL (ref 0–0.7)
EOSINOPHIL NFR BLD: 3.4 % (ref 0–4)
ERYTHROCYTE [DISTWIDTH] IN BLOOD BY AUTOMATED COUNT: 12.5 % (ref 11.5–14.5)
ERYTHROCYTE [SEDIMENTATION RATE] IN BLOOD: 108 MM/HR (ref 0–30)
GLUCOSE SERPL-MCNC: 192 MG/DL (ref 74–106)
GLUCOSE SERPL-MCNC: 216 MG/DL (ref 74–106)
GRANULOCYTES # BLD AUTO: 69.5 % (ref 42.2–75.2)
HCT VFR BLD AUTO: 29.8 % (ref 37–47)
HGB BLD-MCNC: 9.7 G/DL (ref 12.5–16)
LYMPHOCYTES # BLD: 2.1 10*3/UL (ref 1.2–3.4)
LYMPHOCYTES NFR BLD: 15.4 % (ref 20–51)
MCH RBC QN AUTO: 28 PG (ref 27–31)
MCHC RBC AUTO-ENTMCNC: 33 G/DL (ref 33–37)
MCV RBC AUTO: 87 FL (ref 80–100)
MONOCYTES # BLD: 1.4 10*3/UL (ref 0.1–0.6)
MONOCYTES NFR BLD AUTO: 10.2 % (ref 1.7–9.3)
NEUTROPHILS # BLD: 9.5 10*3/UL (ref 1.4–6.5)
PLATELET # BLD AUTO: 417 K/MM3 (ref 130–400)
PMV BLD AUTO: 9.5 FL (ref 7.4–10.4)
POTASSIUM SERPL-SCNC: 5.3 MMOL/L (ref 3.4–5)
POTASSIUM SERPL-SCNC: 5.3 MMOL/L (ref 3.4–5)
RBC # BLD AUTO: 3.43 M/MM3 (ref 4.1–5.3)
SODIUM SERPL-SCNC: 135 MMOL/L (ref 137–145)
SODIUM SERPL-SCNC: 135 MMOL/L (ref 137–145)

## 2019-12-26 NOTE — NUR
Patient reports 10/10 left ankle pain. Ortho doc recently in room assessing
ankle. Provided with PRN percocet at this time.

## 2019-12-26 NOTE — NUR
Assisted patient to bedside commode for BM but patient verbalized she's
pooping already in her diaper. Patient stated pain of 8/10. PRN Oxycodone
given. Elevated her left leg with a blanket.

## 2019-12-26 NOTE — NUR
Seen patient on bed, eating dinner. With cam boot on left leg and pillow and
blanket underneath for elevation. With IFC, on clear yellow urine. Call button
within reach.

## 2019-12-26 NOTE — NUR
Patients son here and updated on the ortho report and usage of CAM walker.
Patient states that she "has gone 4 times per day" and is worried that is too
much.  Spoke with PCT patients stools are soft formed.  [facility] her [facility] better.

## 2019-12-26 NOTE — NUR
Kassandra, at [facility], reports that they are able to accept the patient
for a skilled stay. Kassandra states that she spoke to the patient's son,
Declan, and that he is able to provide transport. CAREN updated the patient's
sons, Miki and Declan. They are both agreeable with the patient going to
[facility], if [facility] is unable to accept. SW to continue
to follow.

## 2019-12-26 NOTE — NUR
SW contacted [facility] and [facility] for an update on
referrals. Anabelle, at [facility], reports that they do not have any
beds today; but that they can revisit tomorrow if able to accept. Kassandra, at
[facility], reports that they are still reviewing the referral and will
contact  when they have an answer. SW to continue to follow.

## 2019-12-26 NOTE — NUR
Patient up to the chair this AM.  Continues to have left foot pain.  Noted
some discoloration in foot.  3+ edema noted.  Patient has foot elevated on
pillow; ice offered.  Will have MRI as ordered by Ortho this AM at 1100.
Patient continues to have pain; offered above.  Patient is alert and oriented
x 3.  Skin w/d. Color pale pink.  Lungs CTA with resp even/unlabored.  Abd
soft with bowels sounds noted.  Patient reports have BM this AM.  Arzola
patent due to urinary retention.  No other needs at this time

## 2019-12-26 NOTE — NUR
ENMANUEL Gambino from orthopedics in this PM.  Reviewed reports with patient.  Order
to have CAM boot placed.  Surgical Nurse brought boot to the floor.  Placed on
patient foot as per order.  Explained usage and weight bearing as tolerated to
patient.  Patient denies any needs.  Continues to c/o general discomfort in
foot when touched.  Denies need for pain medication.  No other questions at
this time

## 2019-12-26 NOTE — NUR
Patient received x2 doses of percocet for left ankle pain. Otherwise
uneventful night. Resting in bed this AM. Denies needs. Call light in reach.

## 2019-12-27 VITALS — SYSTOLIC BLOOD PRESSURE: 131 MMHG | TEMPERATURE: 97.4 F | DIASTOLIC BLOOD PRESSURE: 42 MMHG | HEART RATE: 78 BPM

## 2019-12-27 VITALS — SYSTOLIC BLOOD PRESSURE: 135 MMHG | DIASTOLIC BLOOD PRESSURE: 82 MMHG | TEMPERATURE: 97.8 F | HEART RATE: 83 BPM

## 2019-12-27 VITALS — HEART RATE: 82 BPM | DIASTOLIC BLOOD PRESSURE: 54 MMHG | TEMPERATURE: 98.9 F | SYSTOLIC BLOOD PRESSURE: 131 MMHG

## 2019-12-27 LAB
ANION GAP SERPL CALC-SCNC: 8 MMOL/L (ref 7–16)
BASOPHILS # BLD: 0.1 10*3/UL (ref 0–0.2)
BASOPHILS NFR BLD AUTO: 0.6 % (ref 0–2)
BUN SERPL-MCNC: 36 MG/DL (ref 7–17)
CALCIUM SERPL-MCNC: 9 MG/DL (ref 8.4–10.2)
CHLORIDE SERPL-SCNC: 103 MMOL/L (ref 98–107)
CO2 SERPL-SCNC: 27 MMOL/L (ref 22–30)
CREAT SERPL-SCNC: 1.45 UMOL/L (ref 0.52–1.25)
EOSINOPHIL # BLD: 0.4 10*3/UL (ref 0–0.7)
EOSINOPHIL NFR BLD: 3.6 % (ref 0–4)
ERYTHROCYTE [DISTWIDTH] IN BLOOD BY AUTOMATED COUNT: 12.6 % (ref 11.5–14.5)
GLUCOSE SERPL-MCNC: 98 MG/DL (ref 74–106)
GRANULOCYTES # BLD AUTO: 64.5 % (ref 42.2–75.2)
HCT VFR BLD AUTO: 31.8 % (ref 37–47)
HGB BLD-MCNC: 10.1 G/DL (ref 12.5–16)
LYMPHOCYTES # BLD: 2.4 10*3/UL (ref 1.2–3.4)
LYMPHOCYTES NFR BLD: 20.2 % (ref 20–51)
MAGNESIUM SERPL-MCNC: 1.8 MG/DL (ref 1.6–2.3)
MCH RBC QN AUTO: 28 PG (ref 27–31)
MCHC RBC AUTO-ENTMCNC: 32 G/DL (ref 33–37)
MCV RBC AUTO: 88 FL (ref 80–100)
MONOCYTES # BLD: 1.2 10*3/UL (ref 0.1–0.6)
MONOCYTES NFR BLD AUTO: 10.4 % (ref 1.7–9.3)
NEUTROPHILS # BLD: 7.6 10*3/UL (ref 1.4–6.5)
PLATELET # BLD AUTO: 454 K/MM3 (ref 130–400)
PMV BLD AUTO: 9.7 FL (ref 7.4–10.4)
POTASSIUM SERPL-SCNC: 5 MMOL/L (ref 3.4–5)
RBC # BLD AUTO: 3.61 M/MM3 (ref 4.1–5.3)
SODIUM SERPL-SCNC: 139 MMOL/L (ref 137–145)

## 2019-12-27 NOTE — NUR
Assisted patient to bedside commode for bowel movement. Changed diaper and
perineal care done. Patient verbalize pain of 7/10. PRN oxycodone given.
Tightened cam boot as patient states it feels loose.

## 2019-12-27 NOTE — NUR
CAREN attended clinical rounds. The patient is ready to discharge today. CAREN
contacted Anabelle at [facility]. Anabelle reports that they would need a
provider to provider and then they would be able to accept this afternoon. CAREN
then contacted the patient's son, Declan, to inform of discharge. Declan
reports that him and his family have switched their preference to [facility]. CAREN met with the patient and she confirms wanting to go to [facility]. CAREN updated Anabelle at [facility].
 
The patient is to discharge today, 12/27, to [facility] for a skilled
stay. Transportation to be by private vehicle, via the patient's sons. CAREN
presented and explained the IM form to the patient. The patient verbalized
understanding, signed, and she was provided a copy. No additional needs at
this time.

## 2019-12-27 NOTE — NUR
Patient is awake and alert in room, sitting up in recliner eating breakfast.
I did inquire about pain and she said it has improved since she received pain
medication this morning and has repositioned to the recliner.  She did request
a folded blanket under leg for some slight elevation, did offer a pillow but
she thought that would be too much.  She is alert and oriented, is noted to be
slightly hard of hearing.  Respirations are noted to be even and nonlabored.
Medications administered and taken without difficulty.  Does have personal
items and call light within reach.

## 2019-12-27 NOTE — NUR
Endorsed patient to day shift nurses, [place]. Patient is awake,
lying on bed. States relief of pain.

## 2021-06-09 ENCOUNTER — HOSPITAL ENCOUNTER (OUTPATIENT)
Dept: HOSPITAL 19 - COL.CAR | Age: 86
LOS: 1 days | Discharge: HOME | End: 2021-06-10
Attending: INTERNAL MEDICINE
Payer: MEDICARE

## 2021-06-09 VITALS — HEIGHT: 66.03 IN | BODY MASS INDEX: 25.3 KG/M2 | WEIGHT: 157.41 LBS

## 2021-06-09 VITALS — HEART RATE: 71 BPM | SYSTOLIC BLOOD PRESSURE: 160 MMHG | DIASTOLIC BLOOD PRESSURE: 63 MMHG

## 2021-06-09 VITALS — DIASTOLIC BLOOD PRESSURE: 61 MMHG | SYSTOLIC BLOOD PRESSURE: 142 MMHG | HEART RATE: 58 BPM

## 2021-06-09 VITALS — SYSTOLIC BLOOD PRESSURE: 147 MMHG | HEART RATE: 66 BPM | DIASTOLIC BLOOD PRESSURE: 62 MMHG

## 2021-06-09 VITALS — HEART RATE: 73 BPM | DIASTOLIC BLOOD PRESSURE: 70 MMHG | TEMPERATURE: 98 F | SYSTOLIC BLOOD PRESSURE: 191 MMHG

## 2021-06-09 VITALS — SYSTOLIC BLOOD PRESSURE: 160 MMHG | DIASTOLIC BLOOD PRESSURE: 63 MMHG | HEART RATE: 71 BPM

## 2021-06-09 VITALS — DIASTOLIC BLOOD PRESSURE: 60 MMHG | HEART RATE: 67 BPM | SYSTOLIC BLOOD PRESSURE: 159 MMHG

## 2021-06-09 VITALS — SYSTOLIC BLOOD PRESSURE: 152 MMHG | HEART RATE: 67 BPM | DIASTOLIC BLOOD PRESSURE: 58 MMHG

## 2021-06-09 VITALS — HEART RATE: 70 BPM | DIASTOLIC BLOOD PRESSURE: 61 MMHG | SYSTOLIC BLOOD PRESSURE: 149 MMHG

## 2021-06-09 VITALS — HEART RATE: 66 BPM | SYSTOLIC BLOOD PRESSURE: 146 MMHG | DIASTOLIC BLOOD PRESSURE: 63 MMHG

## 2021-06-09 VITALS — SYSTOLIC BLOOD PRESSURE: 144 MMHG | TEMPERATURE: 98.2 F | HEART RATE: 72 BPM | DIASTOLIC BLOOD PRESSURE: 64 MMHG

## 2021-06-09 VITALS — SYSTOLIC BLOOD PRESSURE: 155 MMHG | DIASTOLIC BLOOD PRESSURE: 61 MMHG | HEART RATE: 68 BPM

## 2021-06-09 VITALS — DIASTOLIC BLOOD PRESSURE: 73 MMHG | SYSTOLIC BLOOD PRESSURE: 178 MMHG | HEART RATE: 69 BPM

## 2021-06-09 VITALS — TEMPERATURE: 98.6 F | DIASTOLIC BLOOD PRESSURE: 71 MMHG | SYSTOLIC BLOOD PRESSURE: 168 MMHG | HEART RATE: 69 BPM

## 2021-06-09 VITALS — DIASTOLIC BLOOD PRESSURE: 63 MMHG | SYSTOLIC BLOOD PRESSURE: 156 MMHG | HEART RATE: 67 BPM

## 2021-06-09 VITALS — SYSTOLIC BLOOD PRESSURE: 171 MMHG | HEART RATE: 69 BPM | DIASTOLIC BLOOD PRESSURE: 64 MMHG

## 2021-06-09 VITALS — SYSTOLIC BLOOD PRESSURE: 162 MMHG | HEART RATE: 66 BPM | DIASTOLIC BLOOD PRESSURE: 65 MMHG

## 2021-06-09 VITALS — DIASTOLIC BLOOD PRESSURE: 65 MMHG | TEMPERATURE: 98.6 F | HEART RATE: 66 BPM | SYSTOLIC BLOOD PRESSURE: 162 MMHG

## 2021-06-09 VITALS — SYSTOLIC BLOOD PRESSURE: 169 MMHG | HEART RATE: 73 BPM | DIASTOLIC BLOOD PRESSURE: 66 MMHG

## 2021-06-09 DIAGNOSIS — I25.2: ICD-10-CM

## 2021-06-09 DIAGNOSIS — I42.9: ICD-10-CM

## 2021-06-09 DIAGNOSIS — R55: ICD-10-CM

## 2021-06-09 DIAGNOSIS — Z95.1: ICD-10-CM

## 2021-06-09 DIAGNOSIS — N18.9: ICD-10-CM

## 2021-06-09 DIAGNOSIS — Z95.818: ICD-10-CM

## 2021-06-09 DIAGNOSIS — I48.0: ICD-10-CM

## 2021-06-09 DIAGNOSIS — I25.10: Primary | ICD-10-CM

## 2021-06-09 LAB
ANION GAP SERPL CALC-SCNC: 5 MMOL/L (ref 7–16)
APTT PPP: 35.3 SECONDS (ref 26–37)
BUN SERPL-MCNC: 23 MG/DL (ref 7–17)
CALCIUM SERPL-MCNC: 9 MG/DL (ref 8.4–10.2)
CHLORIDE SERPL-SCNC: 103 MMOL/L (ref 98–107)
CO2 SERPL-SCNC: 27 MMOL/L (ref 22–30)
CREAT SERPL-SCNC: 1.69 UMOL/L (ref 0.52–1.25)
ERYTHROCYTE [DISTWIDTH] IN BLOOD BY AUTOMATED COUNT: 16.5 % (ref 11.5–14.5)
GLUCOSE SERPL-MCNC: 229 MG/DL (ref 74–106)
HCT VFR BLD AUTO: 34.1 % (ref 37–47)
HGB BLD-MCNC: 10.6 G/DL (ref 12.5–16)
INR BLD: 1 (ref 0.8–3)
MCH RBC QN AUTO: 26 PG (ref 27–31)
MCHC RBC AUTO-ENTMCNC: 31 G/DL (ref 33–37)
MCV RBC AUTO: 84 FL (ref 80–100)
PLATELET # BLD AUTO: 347 K/MM3 (ref 130–400)
PMV BLD AUTO: 9.9 FL (ref 7.4–10.4)
POTASSIUM SERPL-SCNC: 5.3 MMOL/L (ref 3.4–5)
PROTHROMBIN TIME: 11.3 SECONDS (ref 9.7–12.8)
RBC # BLD AUTO: 4.07 M/MM3 (ref 4.1–5.3)
SODIUM SERPL-SCNC: 135 MMOL/L (ref 137–145)

## 2021-06-09 PROCEDURE — C1887 CATHETER, GUIDING: HCPCS

## 2021-06-09 PROCEDURE — C1769 GUIDE WIRE: HCPCS

## 2021-06-09 PROCEDURE — C9600 PERC DRUG-EL COR STENT SING: HCPCS

## 2021-06-09 PROCEDURE — C1764 EVENT RECORDER, CARDIAC: HCPCS

## 2021-06-09 PROCEDURE — C1874 STENT, COATED/COV W/DEL SYS: HCPCS

## 2021-06-10 VITALS — TEMPERATURE: 98 F | DIASTOLIC BLOOD PRESSURE: 69 MMHG | HEART RATE: 69 BPM | SYSTOLIC BLOOD PRESSURE: 170 MMHG

## 2021-06-10 VITALS — DIASTOLIC BLOOD PRESSURE: 67 MMHG | TEMPERATURE: 97.6 F | SYSTOLIC BLOOD PRESSURE: 167 MMHG | HEART RATE: 74 BPM

## 2021-06-10 VITALS — HEART RATE: 71 BPM | TEMPERATURE: 98 F | SYSTOLIC BLOOD PRESSURE: 146 MMHG | DIASTOLIC BLOOD PRESSURE: 50 MMHG

## 2021-06-10 VITALS — SYSTOLIC BLOOD PRESSURE: 124 MMHG | HEART RATE: 75 BPM | DIASTOLIC BLOOD PRESSURE: 92 MMHG

## 2021-06-10 LAB
ANION GAP SERPL CALC-SCNC: 5 MMOL/L (ref 7–16)
BASOPHILS # BLD: 0.1 10*3/UL (ref 0–0.2)
BASOPHILS NFR BLD AUTO: 0.9 % (ref 0–2)
BUN SERPL-MCNC: 22 MG/DL (ref 7–17)
CALCIUM SERPL-MCNC: 9.5 MG/DL (ref 8.4–10.2)
CHLORIDE SERPL-SCNC: 103 MMOL/L (ref 98–107)
CK SERPL-CCNC: 63 U/L (ref 30–135)
CO2 SERPL-SCNC: 28 MMOL/L (ref 22–30)
CREAT SERPL-SCNC: 1.56 UMOL/L (ref 0.52–1.25)
EOSINOPHIL # BLD: 0.4 10*3/UL (ref 0–0.7)
EOSINOPHIL NFR BLD: 3.4 % (ref 0–4)
ERYTHROCYTE [DISTWIDTH] IN BLOOD BY AUTOMATED COUNT: 16.5 % (ref 11.5–14.5)
GLUCOSE SERPL-MCNC: 148 MG/DL (ref 74–106)
GRANULOCYTES # BLD AUTO: 61.5 % (ref 42.2–75.2)
HCT VFR BLD AUTO: 36.8 % (ref 37–47)
HGB BLD-MCNC: 11.3 G/DL (ref 12.5–16)
LYMPHOCYTES # BLD: 2.7 10*3/UL (ref 1.2–3.4)
LYMPHOCYTES NFR BLD: 24.6 % (ref 20–51)
MCH RBC QN AUTO: 26 PG (ref 27–31)
MCHC RBC AUTO-ENTMCNC: 31 G/DL (ref 33–37)
MCV RBC AUTO: 85 FL (ref 80–100)
MONOCYTES # BLD: 1 10*3/UL (ref 0.1–0.6)
MONOCYTES NFR BLD AUTO: 9.1 % (ref 1.7–9.3)
NEUTROPHILS # BLD: 6.8 10*3/UL (ref 1.4–6.5)
PLATELET # BLD AUTO: 363 K/MM3 (ref 130–400)
PMV BLD AUTO: 10.3 FL (ref 7.4–10.4)
POTASSIUM SERPL-SCNC: 5.1 MMOL/L (ref 3.4–5)
RBC # BLD AUTO: 4.35 M/MM3 (ref 4.1–5.3)
SODIUM SERPL-SCNC: 136 MMOL/L (ref 137–145)

## 2023-03-26 NOTE — NUR
Arrived to the unit via stretcher; alert and oriented and cooperative with
staff. Attached to all monitors. Denies complaints at this time. Hospitalist
notified of patient's arrival. CONTINUOUS PULSE OX

## 2023-06-03 NOTE — NUR
AIR REMOVED FROM RIGHT RADIAL SITE, NO BLEEDING OR HEMATOMA NOTED.  WILL
CONTINUE TO MONITOR.
First visit from the .  No needs right now.
PATIENT BROUGHT OVER BY WHEEL CHAIR FROM [facility] FOR POST HEART CATH.  RIGHT
RADIAL INSERTION SITE.  CUFF IN PLACE WITH 15CC AIR IN PLACE, AREA SOFT WITH
NO HEMATOMA.  PATIENT DOES NOT COMPLAIN OF ANY PAIN.  AMBULATED TO TOILET
WITHOUT DIFFICULTY.  IV TO LEFT FOREARM INFUSING FLUIDS WITHOUT DIFFICULTY.
SON JANAK AT BEDSIDE.  MEDICATIONS AND ALLERGIES REIVEWED AND CONFIRMED.  VITAL
SIGNS STABLE.  CALL BELL WITHIN REACH, WILL MONITOR.
PATIENT RESTED WELL OVERNIGHT. WAS ABLE TO AMBULATE TO RESTROOM AND BACK WITH
LITTLE HELP. K AND SBP NOTED QUENTIN AND REPORTED TO DAY SHIFT NURSE.
REPORT GIVEN TO MARTINEZ AND CARE RELINQUISHED AT THIS TIME.
Pt to ICU,report to nurse.
Pt to procedure at this time.
TBAND DEFLATED. NO HEMATOMA. AREA SURROUNDING SITE AND INCLUDING PUNCTURE SITE
IS CLEAN, DRY AND INTACT. LEFT STRAIGHT BOARD ON PATIENT'S WRIST AND
INSTRUCTED HER TO AVOID PUTTING PRESSURE ON HER RIGHT WRIST UNTIL WE ARE SURE
THE SITE HAS FULLY CLOTTED. PATIENT UNDERSTANDS INSTRUCTIONS FOR POST OP CATH
SITE CARE.
No